# Patient Record
Sex: FEMALE | Race: OTHER | Employment: OTHER | URBAN - METROPOLITAN AREA
[De-identification: names, ages, dates, MRNs, and addresses within clinical notes are randomized per-mention and may not be internally consistent; named-entity substitution may affect disease eponyms.]

---

## 2023-07-15 ENCOUNTER — HOSPITAL ENCOUNTER (EMERGENCY)
Age: 78
Discharge: HOME | End: 2023-07-15
Attending: EMERGENCY MEDICINE
Payer: MEDICARE

## 2023-07-15 VITALS
DIASTOLIC BLOOD PRESSURE: 87 MMHG | SYSTOLIC BLOOD PRESSURE: 147 MMHG | HEIGHT: 59 IN | WEIGHT: 135 LBS | OXYGEN SATURATION: 95 % | HEART RATE: 82 BPM | BODY MASS INDEX: 27.21 KG/M2 | TEMPERATURE: 99.1 F | RESPIRATION RATE: 18 BRPM

## 2023-07-15 DIAGNOSIS — M79.89 FOOT SWELLING: ICD-10-CM

## 2023-07-15 DIAGNOSIS — N39.0 URINARY TRACT INFECTION IN FEMALE: Primary | ICD-10-CM

## 2023-07-15 LAB
APPEARANCE UR: ABNORMAL
BACTERIA URNS QL MICRO: ABNORMAL /HPF
BILIRUB UR QL: NEGATIVE
CASTS URNS QL MICRO: 0 /LPF
COLOR UR: ABNORMAL
CRYSTALS URNS QL MICRO: 0 /LPF
EPI CELLS #/AREA URNS HPF: ABNORMAL /HPF
GLUCOSE UR STRIP.AUTO-MCNC: NEGATIVE MG/DL
HGB UR QL STRIP: ABNORMAL
KETONES UR QL STRIP.AUTO: NEGATIVE MG/DL
LEUKOCYTE ESTERASE UR QL STRIP.AUTO: ABNORMAL
MUCOUS THREADS URNS QL MICRO: 0 /LPF
NITRITE UR QL STRIP.AUTO: NEGATIVE
OTHER OBSERVATIONS: ABNORMAL
PH UR STRIP: 7 (ref 5–9)
PROT UR STRIP-MCNC: NEGATIVE MG/DL
RBC #/AREA URNS HPF: ABNORMAL /HPF
SP GR UR REFRACTOMETRY: 1.01 (ref 1–1.02)
UROBILINOGEN UR QL STRIP.AUTO: 0.2 EU/DL (ref 0.2–1)
WBC URNS QL MICRO: ABNORMAL /HPF

## 2023-07-15 PROCEDURE — 81001 URINALYSIS AUTO W/SCOPE: CPT

## 2023-07-15 PROCEDURE — 99283 EMERGENCY DEPT VISIT LOW MDM: CPT

## 2023-07-15 PROCEDURE — 87086 URINE CULTURE/COLONY COUNT: CPT

## 2023-07-15 PROCEDURE — 87186 SC STD MICRODIL/AGAR DIL: CPT

## 2023-07-15 PROCEDURE — 87088 URINE BACTERIA CULTURE: CPT

## 2023-07-15 RX ORDER — CEPHALEXIN 500 MG/1
500 CAPSULE ORAL 2 TIMES DAILY
Qty: 14 CAPSULE | Refills: 0 | Status: SHIPPED | OUTPATIENT
Start: 2023-07-15 | End: 2023-07-22

## 2023-07-15 RX ORDER — ALENDRONATE SODIUM 70 MG/1
70 TABLET ORAL
COMMUNITY

## 2023-07-15 RX ORDER — LOSARTAN POTASSIUM 50 MG/1
50 TABLET ORAL DAILY
COMMUNITY

## 2023-07-15 RX ORDER — LATANOPROST 50 UG/ML
1 SOLUTION/ DROPS OPHTHALMIC NIGHTLY
COMMUNITY

## 2023-07-15 ASSESSMENT — PAIN - FUNCTIONAL ASSESSMENT
PAIN_FUNCTIONAL_ASSESSMENT: NONE - DENIES PAIN
PAIN_FUNCTIONAL_ASSESSMENT: NONE - DENIES PAIN

## 2023-07-15 ASSESSMENT — PAIN SCALES - GENERAL
PAINLEVEL_OUTOF10: 0
PAINLEVEL_OUTOF10: 0

## 2023-07-15 NOTE — ED TRIAGE NOTES
Pt ambulatory to room with daughter. Pt reports urinary frequency and burning during urination x1 week. Pt also reports bilateral ankle swelling that started this week.

## 2023-07-15 NOTE — ED PROVIDER NOTES
Emergency Department Provider Note       PCP: No primary care provider on file. Age: 68 y.o. Sex: female     DISPOSITION Decision To Discharge 07/15/2023 12:45:20 PM       ICD-10-CM    1. Urinary tract infection in female  N39.0       2. Foot swelling  M79.89           Medical Decision Making     Complexity of Problems Addressed:  1 or more chronic illnesses with a severe exacerbation or progression. 1 or more acute illnesses that pose a threat to life or bodily function. Data Reviewed and Analyzed:  Category 1:   I independently ordered and reviewed each unique test.     The patients assessment required an independent historian: Daughter. The reason they were needed is important historical information not provided by the patient. Category 2:   I interpreted the UA results. .    Category 3: Discussion of management or test interpretation. As in HPI. Pleasant well-appearing 43-year-old female with concern for UTI and later reporting concern for foot swelling. Denies fever chills, nausea vomiting, abdominal pain, back pain or flank pain, blood in urine, difficulty urinating, vaginal bleeding abnormal discharge. Denies chest pain, cough, hemoptysis, MOY, orthopnea, dizziness or lightheadedness. States traveling recently, has no problem with walking and has no pain. No history of PE or DVT. Low risk for PE/DVT per Wells criteria. Denies history of CHF, no clinical indication of DVT, CHF, compartment syndrome, fracture, cellulitis or other emergent process, which are among differential diagnoses. Patient refused use of video based language services, wanting daughter to translate. Urine sample consistent with UTI, will get culture. Discussed results with the patient and daughter, answered questions and they are agreeable. They declined offer of any further work-up or assistance. Will prescribe Keflex. No known antibiotic allergies. Feel stable for discharge home at this time.   Blood pressure

## 2023-07-16 LAB
BACTERIA SPEC CULT: ABNORMAL
SERVICE CMNT-IMP: ABNORMAL

## 2023-07-18 LAB
BACTERIA SPEC CULT: ABNORMAL
BACTERIA SPEC CULT: ABNORMAL
SERVICE CMNT-IMP: ABNORMAL